# Patient Record
Sex: FEMALE | URBAN - METROPOLITAN AREA
[De-identification: names, ages, dates, MRNs, and addresses within clinical notes are randomized per-mention and may not be internally consistent; named-entity substitution may affect disease eponyms.]

---
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
## 2025-05-11 ENCOUNTER — APPOINTMENT (EMERGENCY)
Dept: RADIOLOGY | Facility: HOSPITAL | Age: 25
End: 2025-05-11
Payer: COMMERCIAL

## 2025-05-11 ENCOUNTER — HOSPITAL ENCOUNTER (EMERGENCY)
Facility: HOSPITAL | Age: 25
Discharge: LEFT BEFORE DISCHARGE | End: 2025-05-11
Attending: EMERGENCY MEDICINE | Admitting: EMERGENCY MEDICINE
Payer: COMMERCIAL

## 2025-05-11 VITALS
DIASTOLIC BLOOD PRESSURE: 78 MMHG | TEMPERATURE: 98.8 F | SYSTOLIC BLOOD PRESSURE: 123 MMHG | RESPIRATION RATE: 18 BRPM | OXYGEN SATURATION: 99 % | HEART RATE: 78 BPM

## 2025-05-11 DIAGNOSIS — Z34.91 FIRST TRIMESTER PREGNANCY: Primary | ICD-10-CM

## 2025-05-11 DIAGNOSIS — O23.41 URINARY TRACT INFECTION IN MOTHER DURING FIRST TRIMESTER OF PREGNANCY: ICD-10-CM

## 2025-05-11 LAB
ABO + RH BLD: NORMAL
ALBUMIN SERPL-MCNC: 4.1 G/DL (ref 3.5–5.7)
ALP SERPL-CCNC: 56 IU/L (ref 34–125)
ALT SERPL-CCNC: 14 IU/L (ref 7–52)
ANION GAP SERPL CALC-SCNC: 10 MEQ/L (ref 3–15)
AST SERPL-CCNC: 19 IU/L (ref 13–39)
B-HCG UR QL: POSITIVE
BACTERIA URNS QL MICRO: 1 /HPF
BASOPHILS # BLD: 0.05 K/UL (ref 0.01–0.1)
BASOPHILS NFR BLD: 0.6 %
BILIRUB SERPL-MCNC: 0.8 MG/DL (ref 0.3–1.2)
BILIRUB UR QL STRIP.AUTO: NEGATIVE MG/DL
BLD GP AB SCN SERPL QL: NEGATIVE
BUN SERPL-MCNC: 12 MG/DL (ref 7–25)
CALCIUM SERPL-MCNC: 8.9 MG/DL (ref 8.6–10.3)
CHLORIDE SERPL-SCNC: 106 MEQ/L (ref 98–107)
CLARITY UR REFRACT.AUTO: ABNORMAL
CO2 SERPL-SCNC: 18 MEQ/L (ref 21–31)
COLOR UR AUTO: YELLOW
CREAT SERPL-MCNC: 0.7 MG/DL (ref 0.6–1.2)
D AG BLD QL: POSITIVE
DIFFERENTIAL METHOD BLD: ABNORMAL
EGFRCR SERPLBLD CKD-EPI 2021: >60 ML/MIN/1.73M*2
EOSINOPHIL # BLD: 0.22 K/UL (ref 0.04–0.36)
EOSINOPHIL NFR BLD: 2.6 %
ERYTHROCYTE [DISTWIDTH] IN BLOOD BY AUTOMATED COUNT: 12.8 % (ref 11.7–14.4)
GLUCOSE SERPL-MCNC: 81 MG/DL (ref 70–99)
GLUCOSE UR STRIP.AUTO-MCNC: NEGATIVE MG/DL
HCG SERPL-ACNC: 3236 IU/L (MIU/ML)
HCT VFR BLD AUTO: 38.4 % (ref 35–45)
HGB BLD-MCNC: 12.3 G/DL (ref 11.8–15.7)
HGB UR QL STRIP.AUTO: NEGATIVE
HYALINE CASTS #/AREA URNS LPF: ABNORMAL /LPF
IMM GRANULOCYTES # BLD AUTO: 0.03 K/UL (ref 0–0.08)
IMM GRANULOCYTES NFR BLD AUTO: 0.4 %
KETONES UR STRIP.AUTO-MCNC: NEGATIVE MG/DL
LABORATORY COMMENT REPORT: NORMAL
LEUKOCYTE ESTERASE UR QL STRIP.AUTO: 3
LYMPHOCYTES # BLD: 2.08 K/UL (ref 1.2–3.5)
LYMPHOCYTES NFR BLD: 24.6 %
MCH RBC QN AUTO: 28.5 PG (ref 28–33.2)
MCHC RBC AUTO-ENTMCNC: 32 G/DL (ref 32.2–35.5)
MCV RBC AUTO: 88.9 FL (ref 83–98)
MONOCYTES # BLD: 0.56 K/UL (ref 0.28–0.8)
MONOCYTES NFR BLD: 6.6 %
MUCOUS THREADS URNS QL MICRO: ABNORMAL /LPF
NEUTROPHILS # BLD: 5.5 K/UL (ref 1.7–7)
NEUTS SEG NFR BLD: 65.2 %
NITRITE UR QL STRIP.AUTO: NEGATIVE
NRBC BLD-RTO: 0 %
PH UR STRIP.AUTO: 7 [PH]
PLATELET # BLD AUTO: 257 K/UL (ref 150–369)
PMV BLD AUTO: 10 FL (ref 9.4–12.3)
POCT TEST: ABNORMAL
POTASSIUM SERPL-SCNC: 3.6 MEQ/L (ref 3.5–5.1)
PROT SERPL-MCNC: 7.5 G/DL (ref 6–8.2)
PROT UR QL STRIP.AUTO: ABNORMAL
RBC # BLD AUTO: 4.32 M/UL (ref 3.93–5.22)
RBC #/AREA URNS HPF: ABNORMAL /HPF
SODIUM SERPL-SCNC: 134 MEQ/L (ref 136–145)
SP GR UR REFRACT.AUTO: 1.03
SPECIMEN EXP DATE BLD: NORMAL
SQUAMOUS URNS QL MICRO: 1 /HPF
TRANS CELLS URNS QL MICRO: ABNORMAL /HPF
UROBILINOGEN UR STRIP-ACNC: 0.2 EU/DL
WBC # BLD AUTO: 8.44 K/UL (ref 3.8–10.5)
WBC #/AREA URNS HPF: ABNORMAL /HPF

## 2025-05-11 PROCEDURE — 86850 RBC ANTIBODY SCREEN: CPT

## 2025-05-11 PROCEDURE — 76817 TRANSVAGINAL US OBSTETRIC: CPT

## 2025-05-11 PROCEDURE — 76815 OB US LIMITED FETUS(S): CPT

## 2025-05-11 PROCEDURE — 36415 COLL VENOUS BLD VENIPUNCTURE: CPT

## 2025-05-11 PROCEDURE — 80053 COMPREHEN METABOLIC PANEL: CPT | Performed by: EMERGENCY MEDICINE

## 2025-05-11 PROCEDURE — 93975 VASCULAR STUDY: CPT

## 2025-05-11 PROCEDURE — 84702 CHORIONIC GONADOTROPIN TEST: CPT | Performed by: EMERGENCY MEDICINE

## 2025-05-11 PROCEDURE — 85025 COMPLETE CBC W/AUTO DIFF WBC: CPT | Performed by: EMERGENCY MEDICINE

## 2025-05-11 PROCEDURE — 87086 URINE CULTURE/COLONY COUNT: CPT | Performed by: EMERGENCY MEDICINE

## 2025-05-11 PROCEDURE — 99284 EMERGENCY DEPT VISIT MOD MDM: CPT | Mod: 25

## 2025-05-11 PROCEDURE — 81001 URINALYSIS AUTO W/SCOPE: CPT | Performed by: EMERGENCY MEDICINE

## 2025-05-11 RX ORDER — CEPHALEXIN 500 MG/1
500 CAPSULE ORAL 4 TIMES DAILY
Qty: 28 CAPSULE | Refills: 0 | Status: SHIPPED | OUTPATIENT
Start: 2025-05-11 | End: 2025-05-18

## 2025-05-12 LAB
BACTERIA UR CULT: NORMAL
BACTERIA UR CULT: NORMAL

## 2025-05-14 ENCOUNTER — HOSPITAL ENCOUNTER (EMERGENCY)
Facility: HOSPITAL | Age: 25
Discharge: HOME | End: 2025-05-14
Attending: EMERGENCY MEDICINE | Admitting: EMERGENCY MEDICINE
Payer: COMMERCIAL

## 2025-05-14 VITALS
OXYGEN SATURATION: 100 % | RESPIRATION RATE: 18 BRPM | TEMPERATURE: 97.9 F | SYSTOLIC BLOOD PRESSURE: 138 MMHG | HEART RATE: 88 BPM | WEIGHT: 190 LBS | DIASTOLIC BLOOD PRESSURE: 70 MMHG

## 2025-05-14 DIAGNOSIS — O20.0 THREATENED MISCARRIAGE: Primary | ICD-10-CM

## 2025-05-14 LAB
ALBUMIN SERPL-MCNC: 3.9 G/DL (ref 3.5–5.7)
ALP SERPL-CCNC: 56 IU/L (ref 34–125)
ALT SERPL-CCNC: 16 IU/L (ref 7–52)
ANION GAP SERPL CALC-SCNC: 4 MEQ/L (ref 3–15)
AST SERPL-CCNC: 23 IU/L (ref 13–39)
BACTERIA URNS QL MICRO: ABNORMAL /HPF
BASOPHILS # BLD: 0.04 K/UL (ref 0.01–0.1)
BASOPHILS NFR BLD: 0.7 %
BILIRUB SERPL-MCNC: 1 MG/DL (ref 0.3–1.2)
BILIRUB UR QL STRIP.AUTO: NEGATIVE MG/DL
BUN SERPL-MCNC: 10 MG/DL (ref 7–25)
CALCIUM SERPL-MCNC: 8.8 MG/DL (ref 8.6–10.3)
CHLORIDE SERPL-SCNC: 105 MEQ/L (ref 98–107)
CLARITY UR REFRACT.AUTO: CLEAR
CO2 SERPL-SCNC: 26 MEQ/L (ref 21–31)
COLOR UR AUTO: YELLOW
CREAT SERPL-MCNC: 0.6 MG/DL (ref 0.6–1.2)
DIFFERENTIAL METHOD BLD: NORMAL
EGFRCR SERPLBLD CKD-EPI 2021: >60 ML/MIN/1.73M*2
EOSINOPHIL # BLD: 0.18 K/UL (ref 0.04–0.36)
EOSINOPHIL NFR BLD: 3 %
ERYTHROCYTE [DISTWIDTH] IN BLOOD BY AUTOMATED COUNT: 12.8 % (ref 11.7–14.4)
GLUCOSE SERPL-MCNC: 75 MG/DL (ref 70–99)
GLUCOSE UR STRIP.AUTO-MCNC: NEGATIVE MG/DL
HCG SERPL-ACNC: 9368 IU/L (MIU/ML)
HCT VFR BLD AUTO: 38.2 % (ref 35–45)
HGB BLD-MCNC: 12.3 G/DL (ref 11.8–15.7)
HGB UR QL STRIP.AUTO: NEGATIVE
HYALINE CASTS #/AREA URNS LPF: ABNORMAL /LPF
IMM GRANULOCYTES # BLD AUTO: 0.01 K/UL (ref 0–0.08)
IMM GRANULOCYTES NFR BLD AUTO: 0.2 %
KETONES UR STRIP.AUTO-MCNC: NEGATIVE MG/DL
LEUKOCYTE ESTERASE UR QL STRIP.AUTO: 1
LYMPHOCYTES # BLD: 1.41 K/UL (ref 1.2–3.5)
LYMPHOCYTES NFR BLD: 23.6 %
MCH RBC QN AUTO: 28.5 PG (ref 28–33.2)
MCHC RBC AUTO-ENTMCNC: 32.2 G/DL (ref 32.2–35.5)
MCV RBC AUTO: 88.6 FL (ref 83–98)
MONOCYTES # BLD: 0.43 K/UL (ref 0.28–0.8)
MONOCYTES NFR BLD: 7.2 %
MUCOUS THREADS URNS QL MICRO: ABNORMAL /LPF
NEUTROPHILS # BLD: 3.91 K/UL (ref 1.7–7)
NEUTS SEG NFR BLD: 65.3 %
NITRITE UR QL STRIP.AUTO: NEGATIVE
NRBC BLD-RTO: 0 %
PH UR STRIP.AUTO: 6.5 [PH]
PLATELET # BLD AUTO: 269 K/UL (ref 150–369)
PMV BLD AUTO: 9.8 FL (ref 9.4–12.3)
POTASSIUM SERPL-SCNC: 4 MEQ/L (ref 3.5–5.1)
PROT SERPL-MCNC: 7.4 G/DL (ref 6–8.2)
PROT UR QL STRIP.AUTO: NEGATIVE
RBC # BLD AUTO: 4.31 M/UL (ref 3.93–5.22)
RBC #/AREA URNS HPF: ABNORMAL /HPF
SODIUM SERPL-SCNC: 135 MEQ/L (ref 136–145)
SP GR UR REFRACT.AUTO: 1.01
SQUAMOUS URNS QL MICRO: ABNORMAL /HPF
UROBILINOGEN UR STRIP-ACNC: 0.2 EU/DL
WBC # BLD AUTO: 5.98 K/UL (ref 3.8–10.5)
WBC #/AREA URNS HPF: ABNORMAL /HPF

## 2025-05-14 PROCEDURE — 87086 URINE CULTURE/COLONY COUNT: CPT

## 2025-05-14 PROCEDURE — 80053 COMPREHEN METABOLIC PANEL: CPT

## 2025-05-14 PROCEDURE — 87086 URINE CULTURE/COLONY COUNT: CPT | Performed by: EMERGENCY MEDICINE

## 2025-05-14 PROCEDURE — 80053 COMPREHEN METABOLIC PANEL: CPT | Performed by: EMERGENCY MEDICINE

## 2025-05-14 PROCEDURE — 81001 URINALYSIS AUTO W/SCOPE: CPT | Performed by: EMERGENCY MEDICINE

## 2025-05-14 PROCEDURE — 99283 EMERGENCY DEPT VISIT LOW MDM: CPT

## 2025-05-14 PROCEDURE — 81001 URINALYSIS AUTO W/SCOPE: CPT

## 2025-05-14 PROCEDURE — 84702 CHORIONIC GONADOTROPIN TEST: CPT | Performed by: EMERGENCY MEDICINE

## 2025-05-14 PROCEDURE — 84702 CHORIONIC GONADOTROPIN TEST: CPT

## 2025-05-14 PROCEDURE — 85025 COMPLETE CBC W/AUTO DIFF WBC: CPT

## 2025-05-14 PROCEDURE — 36415 COLL VENOUS BLD VENIPUNCTURE: CPT

## 2025-05-14 PROCEDURE — 85025 COMPLETE CBC W/AUTO DIFF WBC: CPT | Performed by: EMERGENCY MEDICINE

## 2025-05-15 LAB — BACTERIA UR CULT: NORMAL

## 2025-07-17 ENCOUNTER — HOSPITAL ENCOUNTER (EMERGENCY)
Facility: HOSPITAL | Age: 25
Discharge: HOME | End: 2025-07-17
Attending: EMERGENCY MEDICINE
Payer: COMMERCIAL

## 2025-07-17 ENCOUNTER — APPOINTMENT (EMERGENCY)
Dept: RADIOLOGY | Facility: HOSPITAL | Age: 25
End: 2025-07-17
Payer: COMMERCIAL

## 2025-07-17 VITALS
SYSTOLIC BLOOD PRESSURE: 123 MMHG | RESPIRATION RATE: 18 BRPM | DIASTOLIC BLOOD PRESSURE: 64 MMHG | HEART RATE: 75 BPM | OXYGEN SATURATION: 98 % | TEMPERATURE: 99 F
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
## 2025-07-17 DIAGNOSIS — S01.81XA FACIAL LACERATION, INITIAL ENCOUNTER: ICD-10-CM

## 2025-07-17 DIAGNOSIS — S09.90XA HEAD INJURY, INITIAL ENCOUNTER: Primary | ICD-10-CM

## 2025-07-17 LAB
ABO + RH BLD: NORMAL
ALBUMIN SERPL-MCNC: 4 G/DL (ref 3.5–5.7)
ALP SERPL-CCNC: 62 IU/L (ref 34–125)
ALT SERPL-CCNC: 13 IU/L (ref 7–52)
ANION GAP SERPL CALC-SCNC: 6 MEQ/L (ref 3–15)
AST SERPL-CCNC: 14 IU/L (ref 13–39)
B-HCG UR QL: ABNORMAL
BASOPHILS # BLD: 0.06 K/UL (ref 0.01–0.1)
BASOPHILS NFR BLD: 0.8 %
BILIRUB SERPL-MCNC: 0.5 MG/DL (ref 0.3–1.2)
BLD GP AB SCN SERPL QL: NEGATIVE
BUN SERPL-MCNC: 12 MG/DL (ref 7–25)
CALCIUM SERPL-MCNC: 8.9 MG/DL (ref 8.6–10.3)
CHLORIDE SERPL-SCNC: 108 MEQ/L (ref 98–107)
CO2 SERPL-SCNC: 24 MEQ/L (ref 21–31)
CREAT SERPL-MCNC: 0.7 MG/DL (ref 0.6–1.2)
D AG BLD QL: POSITIVE
DIFFERENTIAL METHOD BLD: ABNORMAL
EGFRCR SERPLBLD CKD-EPI 2021: >60 ML/MIN/1.73M*2
EOSINOPHIL # BLD: 0.19 K/UL (ref 0.04–0.36)
EOSINOPHIL NFR BLD: 2.5 %
ERYTHROCYTE [DISTWIDTH] IN BLOOD BY AUTOMATED COUNT: 13.5 % (ref 11.7–14.4)
GLUCOSE SERPL-MCNC: 96 MG/DL (ref 70–99)
HCG SERPL-ACNC: 22.1 IU/L (MIU/ML)
HCT VFR BLD AUTO: 37.9 % (ref 35–45)
HGB BLD-MCNC: 11.7 G/DL (ref 11.8–15.7)
IMM GRANULOCYTES # BLD AUTO: 0.01 K/UL (ref 0–0.08)
IMM GRANULOCYTES NFR BLD AUTO: 0.1 %
LABORATORY COMMENT REPORT: NORMAL
LYMPHOCYTES # BLD: 2.54 K/UL (ref 1.2–3.5)
LYMPHOCYTES NFR BLD: 33.6 %
MCH RBC QN AUTO: 27.6 PG (ref 28–33.2)
MCHC RBC AUTO-ENTMCNC: 30.9 G/DL (ref 32.2–35.5)
MCV RBC AUTO: 89.4 FL (ref 83–98)
MONOCYTES # BLD: 0.61 K/UL (ref 0.28–0.8)
MONOCYTES NFR BLD: 8.1 %
NEUTROPHILS # BLD: 4.15 K/UL (ref 1.7–7)
NEUTS SEG NFR BLD: 54.9 %
NRBC BLD-RTO: 0 %
PLATELET # BLD AUTO: 292 K/UL (ref 150–369)
PMV BLD AUTO: 9.9 FL (ref 9.4–12.3)
POCT TEST: ABNORMAL
POTASSIUM SERPL-SCNC: 3.7 MEQ/L (ref 3.5–5.1)
PROT SERPL-MCNC: 7 G/DL (ref 6–8.2)
RBC # BLD AUTO: 4.24 M/UL (ref 3.93–5.22)
SODIUM SERPL-SCNC: 138 MEQ/L (ref 136–145)
SPECIMEN EXP DATE BLD: NORMAL
WBC # BLD AUTO: 7.56 K/UL (ref 3.8–10.5)

## 2025-07-17 PROCEDURE — 63600000 HC DRUGS/DETAIL CODE

## 2025-07-17 PROCEDURE — 90471 IMMUNIZATION ADMIN: CPT

## 2025-07-17 PROCEDURE — 70450 CT HEAD/BRAIN W/O DYE: CPT

## 2025-07-17 PROCEDURE — 85025 COMPLETE CBC W/AUTO DIFF WBC: CPT

## 2025-07-17 PROCEDURE — 3E0234Z INTRODUCTION OF SERUM, TOXOID AND VACCINE INTO MUSCLE, PERCUTANEOUS APPROACH: ICD-10-PCS | Performed by: EMERGENCY MEDICINE

## 2025-07-17 PROCEDURE — 84702 CHORIONIC GONADOTROPIN TEST: CPT

## 2025-07-17 PROCEDURE — 99284 EMERGENCY DEPT VISIT MOD MDM: CPT | Mod: 25

## 2025-07-17 PROCEDURE — 12011 RPR F/E/E/N/L/M 2.5 CM/<: CPT

## 2025-07-17 PROCEDURE — 80053 COMPREHEN METABOLIC PANEL: CPT

## 2025-07-17 PROCEDURE — 0HQ1XZZ REPAIR FACE SKIN, EXTERNAL APPROACH: ICD-10-PCS | Performed by: EMERGENCY MEDICINE

## 2025-07-17 PROCEDURE — 86900 BLOOD TYPING SEROLOGIC ABO: CPT

## 2025-07-17 PROCEDURE — 90715 TDAP VACCINE 7 YRS/> IM: CPT

## 2025-07-17 PROCEDURE — 36415 COLL VENOUS BLD VENIPUNCTURE: CPT

## 2025-07-17 RX ORDER — BACITRACIN ZINC 500 UNIT/G
OINTMENT (GRAM) TOPICAL 2 TIMES DAILY
Qty: 28 G | Refills: 0 | Status: SHIPPED
Start: 2025-07-17 | End: 2025-07-27

## 2025-07-17 RX ADMIN — TETANUS TOXOID, REDUCED DIPHTHERIA TOXOID AND ACELLULAR PERTUSSIS VACCINE, ADSORBED 0.5 ML: 5; 2.5; 8; 8; 2.5 SUSPENSION INTRAMUSCULAR at 04:19

## 2025-07-17 ASSESSMENT — ENCOUNTER SYMPTOMS
LIGHT-HEADEDNESS: 0
NECK STIFFNESS: 0
WEAKNESS: 0
NECK PAIN: 0
TREMORS: 0
DIZZINESS: 0
NUMBNESS: 0
HEADACHES: 1
PHOTOPHOBIA: 0
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
## 2025-07-17 NOTE — DISCHARGE INSTRUCTIONS
Return to the ER if any new, worse or concerning symptoms or signs of infection like redness, swelling, drainage from the wound.  Come back to an ER and urgent care to get your sutures removed in 5 to 7 days.  Do not go swimming or immerse your sutures in a dirty bodies of water such as lakes or ponds.  You can use soap and water to keep it clean  If you develop any abdominal pain, vaginal bleeding, fevers or discharge.  As we discussed take another urine pregnancy test in a week, if it is positive I recommend following up with your OB/GYN for repeat ultrasound and lab work.

## 2025-07-17 NOTE — ED ATTESTATION NOTE
The patient was evaluated and managed by the physician assistant / nurse practitioner.     Shivani Villanueva DO  07/17/25 0683

## 2025-07-22 ENCOUNTER — HOSPITAL ENCOUNTER (EMERGENCY)
Facility: HOSPITAL | Age: 25
Discharge: HOME | End: 2025-07-22
Attending: EMERGENCY MEDICINE | Admitting: EMERGENCY MEDICINE
Payer: COMMERCIAL

## 2025-07-22 VITALS
BODY MASS INDEX: 30.53 KG/M2 | SYSTOLIC BLOOD PRESSURE: 126 MMHG | OXYGEN SATURATION: 99 % | HEIGHT: 66 IN | HEART RATE: 86 BPM | DIASTOLIC BLOOD PRESSURE: 63 MMHG | WEIGHT: 190 LBS | TEMPERATURE: 97.3 F | RESPIRATION RATE: 18 BRPM

## 2025-07-22 DIAGNOSIS — Z48.02 VISIT FOR SUTURE REMOVAL: Primary | ICD-10-CM

## 2025-07-22 LAB
B-HCG UR QL: POSITIVE
POCT TEST: ABNORMAL

## 2025-07-22 PROCEDURE — 99281 EMR DPT VST MAYX REQ PHY/QHP: CPT

## 2025-07-23 NOTE — DISCHARGE INSTRUCTIONS
Over the course of the next several days as your wound is healing looking for signs or symptoms of infection.  This includes redness that is spreading or getting larger, if you begin to notice streaking of red going up around the wound area, it becomes swollen, bigger, warm to touch, begins to present with yellow-green puslike discharge or if you begin to spike fevers, return immediately to the ED for evaluation.  You may require antibiotics or additional care/wound debridement.    ==========================  ==========================  Please follow up as directed to obtain your results and review your plan of care. CALL your primary doctor's office today to schedule a follow up appointment within the next 48 hours. If you do not have a primary doctor or Primary Care Provider (PCP), please call [phone] ([phone]) or go to [email or web address] for help with finding one.    Regarding today's emergency visit, please follow the important instructions here:    REVIEW AND DISCUSS ALL OF YOUR MEDICATIONS WITH YOUR PRIMARY CARE TEAM WITHIN THE NEXT 2 DAYS, even ones that you may have been on for a long time.    Radiology review of your studies (XRAYs, CT Scans, Ultrasounds, MRI scans) may still be pending. Preliminary results may be available today but final written reports may not be available until tomorrow. Important findings may be included in the final radiology review.     Cultures and uncommon labs may take 2 days or more before results are available. Please ask about all pending tests until the final results are known. You may not be notified of important test results unless you ask for these when you call or when you follow up.    Please CALL the Emergency Department at [phone] to obtain the final results within the next 24-48 hours if you were seen at nighttime and there were no formal results/radiology reads published on your examinations. Review the final results of all of  your tests with your primary care doctor within the next 2 days.     Please call or visit your primary doctor or return to this Emergency Department (or the closest Emergency Department) if you have new concerns, if you are not getting better, or if you (or someone who loves you) feels that you are getting worse.    In addition to following up regarding today's emergency visit, I strongly recommend that you visit a Primary Care Provider (PCP) regularly. Your PCP can help you implement the recommendations you were given today, coordinate care among your specialists, as well as make sure you are up to date with wellness exams, immunizations and preventive screenings.  Your PCP can also help when you are feeling sick, potentially avoiding the need for urgent care or emergency department visits.  For these reasons, it is important that you follow up with your PCP at least annually or more often based upon your medical conditions.      Thank you and good luck in your recovery!  ===========================  ===========================

## 2025-07-23 NOTE — ED PROVIDER NOTES
Emergency Medicine Note    Chief Complaint   Patient presents with    Suture / Staple Removal        HISTORY OF PRESENT ILLNESS      Patient seen and Examined in RPK01/RPK01     Therese Kuhn is a 24 y.o. female with PMH of no significance who presents to the ED today via POV for evaluation of suture removal since 5 days.  Patient, she was recently seen in the ED 5 days prior for a head injury where she had 3 stitches placed on the forehead.  Reports one of the stitches fell off earlier, other 2 are in place and she was advised that this should be okay.  Is here for suture removal.  Has not noted any surrounding skin changes or redness, swelling to the area.  Otherwise feels at baseline.. Patient specifically denies any fevers, chills, headaches, dizziness, vision changes, puslike discharge, vaginal discharge, hematuria, dysuria, urinary urgency or frequency.         PAST HISTORY   No past medical history on file.  No past surgical history on file.  No family history on file.            ROS     See HPI above.         MEDICATION REVIEW     Current Meds including meds today:  No current facility-administered medications for this encounter.    Current Outpatient Medications:     bacitracin ointment, Apply topically 2 (two) times a day for 10 days., Disp: 28 g, Rfl: 0    prenat.vits,alissa,min-iron-folic tablet, Take 1 tablet by mouth daily., Disp: 30 each, Rfl: 0       EXAM     ED Triage Vitals   Temp Heart Rate Resp BP SpO2   07/22/25 2004 07/22/25 2004 07/22/25 2004 07/22/25 2005 07/22/25 2004   36.3 °C (97.3 °F) 86 18 126/63 99 %      Temp src Heart Rate Source Patient Position BP Location FiO2 (%) (Set)   -- -- -- -- --              PHYSICAL EXAM  GEN: well appearing, well nourished, pleasant female in no acute distress, positioned sitting upright in the stretcher  HEENT: Well-healed 1 cm laceration noted overlying the medial aspect of the left eyebrow, normocephalic, nonicteric sclera   NECK: soft,  "supple  ABO: NT, ND, normoactive BSx4, no rebound, rigidity, or guarding  MSK: no cyanosis, clubbing, or edema noted. Peripheral pulses equal and symmetric at radial, PT   SKIN: warm, dry, and well perfused, no decreased skin turgor, capillary refills <3 secs  NEURO: Alert, awake, answering questions appropriately   PSYCH: normal mood and affect         PROCEDURES   Suture Removal    Date/Time: 7/22/2025 9:39 PM    Performed by: Mariluz Umanzor PA C  Authorized by: Martin Kevin MD    Consent:     Consent obtained:  Verbal    Consent given by:  Patient    Risks, benefits, and alternatives were discussed: yes      Risks discussed:  Bleeding, pain and wound separation    Alternatives discussed:  No treatment and alternative treatment  Universal protocol:     Procedure explained and questions answered to patient or proxy's satisfaction: yes      Site/side marked: yes      Immediately prior to procedure, a time out was called: yes      Patient identity confirmed:  Verbally with patient and arm band  Location:     Location:  Head/neck    Head/neck location:  Eyebrow    Eyebrow location:  L eyebrow  Procedure details:     Wound appearance:  No signs of infection, good wound healing, nontender, nonpurulent and clean    Number of sutures removed:  2  Post-procedure details:     Post-removal:  No dressing applied    Procedure completion:  Tolerated well, no immediate complications       DATA    COURSE AND DATA   Patient Vitals for the past 24 hrs:   BP Temp Pulse Resp SpO2 Height Weight   07/22/25 2005 126/63 -- -- -- -- -- --   07/22/25 2004 -- 36.3 °C (97.3 °F) 86 18 99 % 1.676 m (5' 6\") 86.2 kg (190 lb)     Labs Reviewed   POCT BHCG, URINE, QUAL (BEAKER) - Abnormal       Result Value    POCT BhCG, Urine Qual Positive (*)     POC Test POC     BEDSIDE PREGNANCY, URINE     No orders to display                  MDM / ED COURSE / CLINICAL IMPRESSION / DISPO     24 y.o. female with past medical history as above presenting " with for suture removal    After my initial evaluation, the patient requires testing, treatment and/or serial reevaluations to rule in or rule out the need for admission and/or emergency surgery: Yes     Will provide the patient with suture removal and reassess periodically.     See orders for and or ED course for more detailed ordered tests and medications.     Coming into the ED for suture removal, well appearing wound.  Sutures removed at bedside without difficulty.  Of note, had 1 episode of vaginal spotting when in the restroom without any abdominal pain or tenderness, no persistent bleeding or discomfort.  Recently underwent medical  with recent hCG of 22 5 days prior.  Has blood test from external OB/GYN at [facility] for repeat blood test in 3 days although they anticipate this is downtrending in the setting of medical .  In the absence of severe symptoms, do not suspect retained products.  Had positive pregnancy test in the ED therefore we will get blood work done in 3 days as scheduled with OB/GYN to ensure downtrending hCG return to the ED sooner for new or worsening symptoms, persistent bleeding or fevers.  She feels very comfortable with this care plan.  Discussed with attending, will discharge    Additional testing/treatment/prescription drug management were also considered: hcg, ob consult    Supportive care discussed.  Return precautions discussed including persistent or excessive vaginal bleeding, abdominal pain, fevers, wound infection.  Patient to be discharged.     The patient has these following underlying medical co-morbilities which may affect their recovery/prognosis: None    MDM Data    Independent historian: None    I reviewed the follwing external documents: Outside Records: ED visit from outside hospital, which show patient most recently evaluated this ED in the setting of head injury 5 days prior where he had a small laceration that was repaired with nonabsorbable  sutures, was told to return to the ED for removal in 5 to 7 days.  A total of 3 sutures were placed per chart review    I independently interpreted the following studies (see ED course): Labs:      I spoke with the following providers/staff about the patients management plan: None    The patient may have these social determents which will impact their treatment plan: None    Decision rules/scores evaluated: none      Treatment and Disposition:    Consideration for admission but ultimately will plan for discharge. There is no current indication for admission based on labs, clinical course, and presenting complaint  that was found during the emergency department visit.    The patient expressed understanding of and agreement with all items discussed and further progression of care plan.     ED Course as of 25e  Patient just went to the restroom, reports she had some mild vaginal spotting when wiping.  Notes she underwent a recent medical  with pills, had an hCG drawn 5 days prior which was 22.  Called [facility] where her OB/GYN is and they recommended repeat hCG on Friday as they anticipate this is just residual from recent  although advised her to return to the ED for any vaginal bleeding.  Will obtain urine pregnancy, if negative will stop workup.  If positive, will obtain hCG and OB input as necessary.  No persistent or heavy vaginal bleeding, denies any abdominal or pelvic pain [DJ]    POCT BhCG, Urine Qual(!): Positive [DJ]   2120 Discussed positive pregnancy test with patient, she denies any persistent vaginal bleeding, pain, no abdominal tenderness on examination.  Discussed likely downtrending in the setting of recent .  Do not clinically suspect retained products given last hCG was 22.  She has an outpatient prescription to have repeat hCG done in 2 days per her OB/GYN, she will do this and return to the ED sooner if she begins to have  persistent vaginal bleeding.  She feels very comfortable with this care plan [DJ]   2138 Discussed patient's history and presentation, physical examination, all pertinent EKG/labs/imaging with attending physician in detail.  They are agreeable with care plan as discussed and workup as done, recommend discharge, agrees with deferring hCG blood test in the ED today in the absence of any red flag symptoms or persistent bleeding concerning for retained products, agreeable with final disposition. [DJ]      ED Course User Index  [DJ] Mariluz Umanzor PA C      IMPRESSION:   Final diagnoses:   [Z48.02] Visit for suture removal        FOLLOW UP & MEDS     Follow Up  Amber Mackey MD  [address] [facility] [address]  [address]  [place] [address] [phone]    Schedule an appointment as soon as possible for a visit in 2 days      hCG blood test    Go on 7/25/2025  As scheduled outpatient with OB/GYN      Discharge Meds  ED Prescriptions    None          DISCHARGE  ------------------------------------------------  Mariluz Umanzor PA-C  7/22/2025 @ 9:41 PM  ------------------------------------------------     Mariluz Umanzor PA C  07/22/25 2141

## 2025-07-24 NOTE — ED ATTESTATION NOTE
Patient was seen by the PA/NP.  Agree with note and management.  I was present in the ED and immediately available if needed.     Martin Kevin MD  07/24/25 0034